# Patient Record
Sex: FEMALE | Race: WHITE | HISPANIC OR LATINO | Employment: UNEMPLOYED | URBAN - METROPOLITAN AREA
[De-identification: names, ages, dates, MRNs, and addresses within clinical notes are randomized per-mention and may not be internally consistent; named-entity substitution may affect disease eponyms.]

---

## 2017-04-28 ENCOUNTER — HOSPITAL ENCOUNTER (EMERGENCY)
Facility: HOSPITAL | Age: 32
Discharge: HOME | End: 2017-04-28
Attending: EMERGENCY MEDICINE

## 2017-04-28 VITALS
SYSTOLIC BLOOD PRESSURE: 105 MMHG | DIASTOLIC BLOOD PRESSURE: 74 MMHG | RESPIRATION RATE: 18 BRPM | OXYGEN SATURATION: 99 % | TEMPERATURE: 98 F | HEART RATE: 74 BPM

## 2017-04-28 DIAGNOSIS — M79.639 FOREARM PAIN: ICD-10-CM
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
## 2017-04-28 DIAGNOSIS — S50.12XA CONTUSION OF LEFT FOREARM, INITIAL ENCOUNTER: ICD-10-CM

## 2017-04-28 DIAGNOSIS — S16.1XXA NECK STRAIN, INITIAL ENCOUNTER: ICD-10-CM

## 2017-04-28 PROCEDURE — 99284 EMERGENCY DEPT VISIT MOD MDM: CPT

## 2017-04-28 PROCEDURE — 25000003 PHARM REV CODE 250: Performed by: EMERGENCY MEDICINE

## 2017-04-28 RX ORDER — HYDROCODONE BITARTRATE AND ACETAMINOPHEN 10; 325 MG/1; MG/1
1 TABLET ORAL EVERY 4 HOURS PRN
Qty: 18 TABLET | Refills: 0 | Status: SHIPPED | OUTPATIENT
Start: 2017-04-28

## 2017-04-28 RX ORDER — HYDROCODONE BITARTRATE AND ACETAMINOPHEN 10; 325 MG/1; MG/1
1 TABLET ORAL
Status: COMPLETED | OUTPATIENT
Start: 2017-04-28 | End: 2017-04-28

## 2017-04-28 RX ORDER — CYCLOBENZAPRINE HCL 10 MG
10 TABLET ORAL 3 TIMES DAILY PRN
Qty: 15 TABLET | Refills: 0 | Status: SHIPPED | OUTPATIENT
Start: 2017-04-28 | End: 2017-05-03

## 2017-04-28 RX ADMIN — HYDROCODONE BITARTRATE AND ACETAMINOPHEN 1 TABLET: 10; 325 TABLET ORAL at 07:04
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
## 2017-04-29 NOTE — DISCHARGE INSTRUCTIONS
Esguince O Distención Del Roxy [Neck Sprain/Strain]  Valorie fuerza repentina que provoque valorie torcedura o flexión del roxy (francisco lanie sucede, por ejemplo, en un accidente de automóvil) puede estirar o desgarrar los músculos (esguince [strain]) y los ligamentos (distención [sprain]) y ocasionar dolor en el roxy. En ocasiones, el dolor en el roxy (neck pain) ocurre después de un movimiento simple augustin extraño. Cualquiera sea el mavis, suele teodoro espasmos musculares (muscle spasm), lo cual aumenta el dolor.    A menos que usted haya tenido valorie lesión física por impacto (por ejemplo, valorie caída o un accidente de automóvil), no suelen pedirse radiografías (X-rays) para la evaluación inicial del dolor de roxy. Si el dolor persiste y no responde al tratamiento médico, es posible que le soliciten radiografías (X-rays) y otras pruebas más adelante.  Cuidados En [place]:  1) Es posible que sienta más dolor (soreness) y que se presenten otros espasmos (spasm) en las próximas 24 horas. Reduzca fitzpatrick nivel de actividad hasta que los síntomas comiencen a aliviarse.  2) Al acostarse, emplee valorie almohada cómoda para apoyar la julio cesar y mantener la columna en valorie posición neutra. La julio cesar no debería quedar inclinada hacia adelante ni hacia atrás.  3) Use compresas de hielo (hielo en valorie bolsa plástica, envuelta en valorie toalla) para aliviar el dolor mayra. Aplíquela giovanna 20 minutos cada 2 ó 4 horas en los primeros dos días. Luego, comience a aplicar calor local (valorie ducha caliente, un baño caliente o valorie almohadilla térmica) y masajes para reducir los espasmos musculares (muscle spasm). Algunos pacientes sienten que les resulta mejor alternar los tratamientos con calor y con frío. También puede utilizar sólo celi de estos métodos. Elija el que le resulte mejor y le proporcione mayor alivio.  4) Puede usar acetaminofén (acetaminophen) (Tylenol) o ibuprofeno (ibuprofen) (Motrin o Advil) para controlar el dolor, a menos que le  hayan recetado otro medicamento. [ NOTA : Si tiene valorie enfermedad hepática o renal crónica (chronic liver or kidney disease), o ha tenido alguna vez valorie úlcera estomacal (stomach ulcer) o sangrado gastrointestinal (GI bleeding), consulte con fitzpatrick médico antes de mehreen estos medicamentos.]  Programe valorie VISITA DE CONTROL con fitzpatrick médico o aramis centro si los síntomas no empiezan a mejorar después de valorie semana. Quizás necesite obtener fisioterapia (physical therapy).  [NOTA: Si le lugo hecho radiografías (X-rays) o valorie tomografía computarizada (CT scan), éstas serán evaluadas por un especialista. Le informaremos de los nuevos hallazgos que puedan afectar la atención médica que necesita.]  Busque Prontamente Atención Médica  si algo de lo siguiente ocurre:  -- El dolor empeora o se esparce a los brazos.  -- Siente debilidad o entumecimiento en celi o ambos brazos.  Date Last Reviewed: 11/19/2015  © 2870-7247 [employer]. [address] [place] [id number]. Todos los derechos reservados. Esta información no pretende sustituir la atención médica profesional. Sólo fitzpatrick médico puede diagnosticar y tratar un problema de cirilo.          R.I.C.E.  R.I.C.E. es el acrónimo de descansar, ponerse hielo, hacer presión y tener levantada la caryn lesionada, son medidas que le ayudan a tener menos dolor e hinchazón después de valorie distensión, esguince, magulladura o golpe sheng. Si se ha lesionado, siga estas sugerencias para mehreen medidas lo antes posible.  [place]  El dolor es la forma que tiene el cuerpo de decir que el área lesionada debe descansar. Ya sea que se haya golpeado el codo, la mano, el pie o la rodilla, limite el uso de la parte lesionada para evitar un mayor daño y ayudarse a sanar.  Hielo  Colocarse hielo inmediatamente después de valorie lesión ayuda a evitar la hinchazón y a calmar el dolor. No ponga el hielo directamente sobre la piel.  · Envuelva valorie bolsa de hielo en valorie phong delgada y colóquela sobre  el área lesionada.  · Póngase el hielo giovanna 10 minutos cada 3 horas, augustin no más de 20 minutos cada vez.  Compresión  Hacer presión (compresión) sobre la lesión ayuda a evitar la hinchazón y sirve de soporte.  · Vende firmemente el área lesionada con valorie venda elástica. Si siente cosquilleo en la mano o el pie, cambian de color o los siente fríos cuando los toca, puede ser que el vendaje esté muy ajustado. Lalit un nuevo vendaje más flojo.   · Si el vendaje se afloja demasiado, vuelva a vendarse.  · No use valorie venda elástica giovanna toda la noche.  Elevación  La elevación es más eficaz cuando se mantiene la lesión elevada más alto que el nivel del corazón. Mantener valorie lesión elevada ayuda a reducir la hinchazón, el dolor y la sensación palpitante.  Llame a fitzpatrick proveedor de atención médica si nota cualquiera de estos síntomas:  · Los dedos de la mano o del pie están adormecidos, lugo cambiado de color o están fríos cuando los toca.  · La piel está brillante o estirada.  · Empeora el dolor, la hinchazón o el moretón, y no mejoran cuando la lesión se levanta.       Date Last Reviewed: 9/3/2015  © 4445-1652 [employer]. [address] [place] [address], [place] [address]. Todos los derechos reservados. Esta información no pretende sustituir la atención médica profesional. Sólo fitzpatrick médico puede diagnosticar y tratar un problema de cirilo.
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
## 2019-07-17 ENCOUNTER — HOSPITAL ENCOUNTER (EMERGENCY)
Facility: HOSPITAL | Age: 34
Discharge: HOME | End: 2019-07-17
Attending: EMERGENCY MEDICINE
Payer: COMMERCIAL

## 2019-07-17 VITALS
WEIGHT: 150 LBS | OXYGEN SATURATION: 100 % | TEMPERATURE: 98 F | DIASTOLIC BLOOD PRESSURE: 73 MMHG | SYSTOLIC BLOOD PRESSURE: 108 MMHG | HEART RATE: 58 BPM | RESPIRATION RATE: 16 BRPM

## 2019-07-17 DIAGNOSIS — M54.31 SCIATICA OF RIGHT SIDE: Primary | ICD-10-CM

## 2019-07-17 LAB
ALBUMIN SERPL BCP-MCNC: 3.7 G/DL (ref 3.5–5.2)
ALP SERPL-CCNC: 60 U/L (ref 55–135)
ALT SERPL W/O P-5'-P-CCNC: 15 U/L (ref 10–44)
ANION GAP SERPL CALC-SCNC: 11 MMOL/L (ref 8–16)
AST SERPL-CCNC: 19 U/L (ref 10–40)
B-HCG UR QL: NEGATIVE
BASOPHILS # BLD AUTO: 0.02 K/UL (ref 0–0.2)
BASOPHILS NFR BLD: 0.4 % (ref 0–1.9)
BILIRUB SERPL-MCNC: 0.3 MG/DL (ref 0.1–1)
BILIRUB UR QL STRIP: NEGATIVE
BUN SERPL-MCNC: 14 MG/DL (ref 6–20)
CALCIUM SERPL-MCNC: 9.2 MG/DL (ref 8.7–10.5)
CHLORIDE SERPL-SCNC: 106 MMOL/L (ref 95–110)
CLARITY UR: CLEAR
CO2 SERPL-SCNC: 22 MMOL/L (ref 23–29)
COLOR UR: YELLOW
CREAT SERPL-MCNC: 0.9 MG/DL (ref 0.5–1.4)
DIFFERENTIAL METHOD: NORMAL
EOSINOPHIL # BLD AUTO: 0.2 K/UL (ref 0–0.5)
EOSINOPHIL NFR BLD: 3.9 % (ref 0–8)
ERYTHROCYTE [DISTWIDTH] IN BLOOD BY AUTOMATED COUNT: 13.4 % (ref 11.5–14.5)
EST. GFR  (AFRICAN AMERICAN): >60 ML/MIN/1.73 M^2
EST. GFR  (NON AFRICAN AMERICAN): >60 ML/MIN/1.73 M^2
GLUCOSE SERPL-MCNC: 84 MG/DL (ref 70–110)
GLUCOSE UR QL STRIP: NEGATIVE
HCT VFR BLD AUTO: 39.2 % (ref 37–48.5)
HGB BLD-MCNC: 13.1 G/DL (ref 12–16)
HGB UR QL STRIP: NEGATIVE
KETONES UR QL STRIP: NEGATIVE
LEUKOCYTE ESTERASE UR QL STRIP: NEGATIVE
LYMPHOCYTES # BLD AUTO: 1.9 K/UL (ref 1–4.8)
LYMPHOCYTES NFR BLD: 36.3 % (ref 18–48)
MCH RBC QN AUTO: 30.1 PG (ref 27–31)
MCHC RBC AUTO-ENTMCNC: 33.4 G/DL (ref 32–36)
MCV RBC AUTO: 90 FL (ref 82–98)
MONOCYTES # BLD AUTO: 0.6 K/UL (ref 0.3–1)
MONOCYTES NFR BLD: 10.9 % (ref 4–15)
NEUTROPHILS # BLD AUTO: 2.5 K/UL (ref 1.8–7.7)
NEUTROPHILS NFR BLD: 48.9 % (ref 38–73)
NITRITE UR QL STRIP: NEGATIVE
PH UR STRIP: 6 [PH] (ref 5–8)
PLATELET # BLD AUTO: 232 K/UL (ref 150–350)
PMV BLD AUTO: 11.7 FL (ref 9.2–12.9)
POTASSIUM SERPL-SCNC: 3.8 MMOL/L (ref 3.5–5.1)
PROT SERPL-MCNC: 7.1 G/DL (ref 6–8.4)
PROT UR QL STRIP: NEGATIVE
RBC # BLD AUTO: 4.35 M/UL (ref 4–5.4)
SODIUM SERPL-SCNC: 139 MMOL/L (ref 136–145)
SP GR UR STRIP: 1.01 (ref 1–1.03)
URN SPEC COLLECT METH UR: NORMAL
UROBILINOGEN UR STRIP-ACNC: NEGATIVE EU/DL
WBC # BLD AUTO: 5.13 K/UL (ref 3.9–12.7)

## 2019-07-17 PROCEDURE — 51798 US URINE CAPACITY MEASURE: CPT

## 2019-07-17 PROCEDURE — 96374 THER/PROPH/DIAG INJ IV PUSH: CPT

## 2019-07-17 PROCEDURE — 80053 COMPREHEN METABOLIC PANEL: CPT

## 2019-07-17 PROCEDURE — 25000003 PHARM REV CODE 250: Performed by: EMERGENCY MEDICINE

## 2019-07-17 PROCEDURE — 81003 URINALYSIS AUTO W/O SCOPE: CPT

## 2019-07-17 PROCEDURE — 96375 TX/PRO/DX INJ NEW DRUG ADDON: CPT

## 2019-07-17 PROCEDURE — 99284 EMERGENCY DEPT VISIT MOD MDM: CPT | Mod: 25

## 2019-07-17 PROCEDURE — 63600175 PHARM REV CODE 636 W HCPCS: Performed by: EMERGENCY MEDICINE

## 2019-07-17 PROCEDURE — 96361 HYDRATE IV INFUSION ADD-ON: CPT

## 2019-07-17 PROCEDURE — 85025 COMPLETE CBC W/AUTO DIFF WBC: CPT

## 2019-07-17 PROCEDURE — 81025 URINE PREGNANCY TEST: CPT

## 2019-07-17 RX ORDER — ONDANSETRON 2 MG/ML
4 INJECTION INTRAMUSCULAR; INTRAVENOUS
Status: COMPLETED | OUTPATIENT
Start: 2019-07-17 | End: 2019-07-17

## 2019-07-17 RX ORDER — KETOROLAC TROMETHAMINE 30 MG/ML
30 INJECTION, SOLUTION INTRAMUSCULAR; INTRAVENOUS
Status: COMPLETED | OUTPATIENT
Start: 2019-07-17 | End: 2019-07-17

## 2019-07-17 RX ORDER — NAPROXEN 500 MG/1
500 TABLET ORAL 2 TIMES DAILY WITH MEALS
Qty: 30 TABLET | Refills: 0 | Status: SHIPPED | OUTPATIENT
Start: 2019-07-17

## 2019-07-17 RX ORDER — CYCLOBENZAPRINE HCL 10 MG
10 TABLET ORAL 3 TIMES DAILY PRN
Qty: 15 TABLET | Refills: 0 | Status: SHIPPED | OUTPATIENT
Start: 2019-07-17 | End: 2019-07-22

## 2019-07-17 RX ADMIN — SODIUM CHLORIDE 1000 ML: 0.9 INJECTION, SOLUTION INTRAVENOUS at 12:07

## 2019-07-17 RX ADMIN — ONDANSETRON 4 MG: 2 INJECTION INTRAMUSCULAR; INTRAVENOUS at 09:07

## 2019-07-17 RX ADMIN — SODIUM CHLORIDE 1000 ML: 0.9 INJECTION, SOLUTION INTRAVENOUS at 09:07

## 2019-07-17 RX ADMIN — KETOROLAC TROMETHAMINE 30 MG: 30 INJECTION, SOLUTION INTRAMUSCULAR at 11:07

## 2019-07-17 NOTE — ED NOTES
Pt presents to ED today with c/o right sided flank pain. Associated symptoms include 1 episode of emesis & decreased urine output. Pt denies chest pain, SOB, fever, chills.    Patient identifiers verified and correct for Robert Harper.    Level of Consciousness: The patient is awake, alert and aware of environment with an appropriate affect, the patient is oriented x 3 and speaking appropriately.  Appearance: Patient resting comfortably and in no acute distress, patient is clean and well groomed, patient's clothing is properly fastened.  Skin: The skin is warm and dry, color consistent with ethnicity, patient has normal skin turgor and moist mucus membranes, skin intact, no breakdown or bruising noted.  Musculoskeletal: Moves all extremities well in full range of motion. No obvious deformities or swelling noted.  Respiratory: Airway open and patent, respirations spontaneous, even and unlabored. No accessory muscles in use. Breath sounds clear & equal  Cardiac: Patient has a normal rate, no periphreal edema noted, capillary refill < 3 seconds. good pulses palpated peripherally.  Abdomen: Soft, non-tender to palpation. No distention noted. +right flank pain, pt reports last urinated yesterday (bladder scan performed- see charting)  Neurologic: PERRLA, face exhibits symmetrical expression, hand grasps equal and even bilaterally, reports normal sensation to all extremities and face.    Patient verbalized understanding of status and plan of care. Patient changed into hospital gown with assistance. Side rails up x 2, call light in reach, bed low and locked. [facility].

## 2019-07-17 NOTE — ED PROVIDER NOTES
SCRIBE #1 NOTE: I, Mary Ellen Garcias, am scribing for, and in the presence of, Azul Jacinto MD. I have scribed the entire note.       History     Chief Complaint   Patient presents with    Flank Pain     right flank pain with vomiting     Review of patient's allergies indicates:  No Known Allergies      History of Present Illness     HPI    7/17/2019, 10:15 AM  History obtained from the patient      History of Present Illness: Robert Harper is a 33 y.o. female patient with a PMHx of HIV and kidney stone (3 years ago) who presents to the Emergency Department for evaluation of R flank pain which onset gradually 3 days ago. Pain radiates down to lower abdomen and R leg. Symptoms are constant and moderate in severity. No mitigating or exacerbating factors reported. Associated sxs include vomiting (1 episode). Last urinated yesterday. Patient denies any fever, chills, dysuria, hematuria, urinary frequency/urgency, N/V, and all other sxs at this time. No prior Tx. No further complaints or concerns at this time.         Arrival mode: Personal vehicle    PCP: Primary Doctor No        Past Medical History:  Past Medical History:   Diagnosis Date    HIV (human immunodeficiency virus infection)        Past Surgical History:  History reviewed. No pertinent surgical history.    Family History:  History reviewed. No pertinent family history.    Social History     Socioeconomic History    Marital status:      Spouse name: Not on file    Number of children: Not on file    Years of education: Not on file    Highest education level: Not on file   Occupational History    Not on file   Social Needs    Financial resource strain: Not on file    Food insecurity:     Worry: Not on file     Inability: Not on file    Transportation needs:     Medical: Not on file     Non-medical: Not on file   Tobacco Use    Smoking status: Never Smoker   Substance and Sexual Activity    Alcohol use: Not Currently     Drug use: Not Currently    Sexual activity: Not on file   Lifestyle    Physical activity:     Days per week: Not on file     Minutes per session: Not on file    Stress: Not on file   Relationships    Social connections:     Talks on phone: Not on file     Gets together: Not on file     Attends Oriental orthodox service: Not on file     Active member of club or organization: Not on file     Attends meetings of clubs or organizations: Not on file     Relationship status: Not on file   Other Topics Concern    Not on file   Social History Narrative    Not on file              Review of Systems     Review of Systems   Constitutional: Negative for activity change, appetite change, chills, diaphoresis, fatigue and fever.   HENT: Negative for congestion, ear pain, nosebleeds, rhinorrhea, sinus pain, sore throat and trouble swallowing.    Eyes: Negative for pain and discharge.   Respiratory: Negative for cough, chest tightness, shortness of breath, wheezing and stridor.    Cardiovascular: Negative for chest pain, palpitations and leg swelling.   Gastrointestinal: Positive for abdominal pain (lower) and vomiting. Negative for abdominal distention, blood in stool, constipation, diarrhea and nausea.   Genitourinary: Positive for decreased urine volume and flank pain (R). Negative for difficulty urinating, dysuria, frequency, hematuria and urgency.   Musculoskeletal: Negative for arthralgias, back pain, myalgias and neck pain.   Skin: Negative for pallor, rash and wound.   Neurological: Negative for dizziness, syncope, weakness, light-headedness, numbness and headaches.   Hematological: Does not bruise/bleed easily.   Psychiatric/Behavioral: Negative for confusion and self-injury.   All other systems reviewed and are negative.     Physical Exam     Initial Vitals [07/17/19 0925]   BP Pulse Resp Temp SpO2   (!) 99/58 68 (!) 26 97.5 °F (36.4 °C) 100 %      MAP       --          Physical Exam  Nursing Notes and Vital Signs  Reviewed.  Constitutional: Patient is in mild distress. Well-developed and well-nourished.  Head: Atraumatic. Normocephalic.  Eyes: PERRL. EOM intact. Conjunctivae are not pale. No scleral icterus.  ENT: Mucous membranes are moist. Oropharynx is clear and symmetric.    Neck: Supple. Full ROM. No lymphadenopathy.  Cardiovascular: Regular rate. Regular rhythm. No murmurs, rubs, or gallops. Distal pulses are 2+ and symmetric.  Pulmonary/Chest: No respiratory distress. Clear to auscultation bilaterally. No wheezing or rales.  Abdominal: Soft and non-distended.  There is no tenderness.  No rebound, guarding, or rigidity. Good bowel sounds.  Genitourinary: R CVA tenderness  Musculoskeletal: Moves all extremities. No obvious deformities. No edema. No calf tenderness. Negative straight leg test. Normal gait and motor observed.   Skin: Warm and dry.  Neurological:  Alert, awake, and appropriate.  Normal speech.  No acute focal neurological deficits are appreciated.  Psychiatric: Normal affect. Good eye contact. Appropriate in content.     ED Course   Procedures  ED Vital Signs:  Vitals:    07/17/19 0925 07/17/19 1206 07/17/19 1235 07/17/19 1246   BP: (!) 99/58 (!) 100/59 (!) 93/50 (!) 102/55   Pulse: 68 (!) 49 (!) 55 (!) 54   Resp: (!) 26  16    Temp: 97.5 °F (36.4 °C)  97.9 °F (36.6 °C)    TempSrc: Oral  Oral    SpO2: 100% 99% 100%    Weight: 68 kg (150 lb)       07/17/19 1248 07/17/19 1250 07/17/19 1349   BP: 104/60 108/62 108/73   Pulse: (!) 56 (!) 58 (!) 58   Resp:   16   Temp:   98 °F (36.7 °C)   TempSrc:   Oral   SpO2:   100%   Weight:          Abnormal Lab Results:  Labs Reviewed   COMPREHENSIVE METABOLIC PANEL - Abnormal; Notable for the following components:       Result Value    CO2 22 (*)     All other components within normal limits   CBC W/ AUTO DIFFERENTIAL   URINALYSIS, REFLEX TO URINE CULTURE    Narrative:     Preferred Collection Type->Urine, Clean Catch   PREGNANCY TEST, URINE RAPID        All Lab  Results:  Results for orders placed or performed during the hospital encounter of 07/17/19   CBC auto differential   Result Value Ref Range    WBC 5.13 3.90 - 12.70 K/uL    RBC 4.35 4.00 - 5.40 M/uL    Hemoglobin 13.1 12.0 - 16.0 g/dL    Hematocrit 39.2 37.0 - 48.5 %    Mean Corpuscular Volume 90 82 - 98 fL    Mean Corpuscular Hemoglobin 30.1 27.0 - 31.0 pg    Mean Corpuscular Hemoglobin Conc 33.4 32.0 - 36.0 g/dL    RDW 13.4 11.5 - 14.5 %    Platelets 232 150 - 350 K/uL    MPV 11.7 9.2 - 12.9 fL    Gran # (ANC) 2.5 1.8 - 7.7 K/uL    Lymph # 1.9 1.0 - 4.8 K/uL    Mono # 0.6 0.3 - 1.0 K/uL    Eos # 0.2 0.0 - 0.5 K/uL    Baso # 0.02 0.00 - 0.20 K/uL    Gran% 48.9 38.0 - 73.0 %    Lymph% 36.3 18.0 - 48.0 %    Mono% 10.9 4.0 - 15.0 %    Eosinophil% 3.9 0.0 - 8.0 %    Basophil% 0.4 0.0 - 1.9 %    Differential Method Automated    Comprehensive metabolic panel   Result Value Ref Range    Sodium 139 136 - 145 mmol/L    Potassium 3.8 3.5 - 5.1 mmol/L    Chloride 106 95 - 110 mmol/L    CO2 22 (L) 23 - 29 mmol/L    Glucose 84 70 - 110 mg/dL    BUN, Bld 14 6 - 20 mg/dL    Creatinine 0.9 0.5 - 1.4 mg/dL    Calcium 9.2 8.7 - 10.5 mg/dL    Total Protein 7.1 6.0 - 8.4 g/dL    Albumin 3.7 3.5 - 5.2 g/dL    Total Bilirubin 0.3 0.1 - 1.0 mg/dL    Alkaline Phosphatase 60 55 - 135 U/L    AST 19 10 - 40 U/L    ALT 15 10 - 44 U/L    Anion Gap 11 8 - 16 mmol/L    eGFR if African American >60 >60 mL/min/1.73 m^2    eGFR if non African American >60 >60 mL/min/1.73 m^2   Urinalysis, Reflex to Urine Culture Urine, Clean Catch   Result Value Ref Range    Specimen UA Urine, Clean Catch     Color, UA Yellow Yellow, Straw, Eliz    Appearance, UA Clear Clear    pH, UA 6.0 5.0 - 8.0    Specific Gravity, UA 1.015 1.005 - 1.030    Protein, UA Negative Negative    Glucose, UA Negative Negative    Ketones, UA Negative Negative    Bilirubin (UA) Negative Negative    Occult Blood UA Negative Negative    Nitrite, UA Negative Negative    Urobilinogen, UA  Negative <2.0 EU/dL    Leukocytes, UA Negative Negative   Pregnancy, urine rapid (UPT)   Result Value Ref Range    Preg Test, Ur Negative          Imaging Results:  Imaging Results          CT Renal Stone Study ABD Pelvis WO (Final result)  Result time 07/17/19 12:13:51    Final result by Nolberto Drummond III, MD (07/17/19 12:13:51)                 Impression:      L5 spondylolysis with minimal grade 1 spondylolisthesis.  No acute abnormality identified in the abdomen or pelvis.  Negative for urolithiasis or hydronephrosis.  Chronic right renal atrophy.    All CT scans at this facility are performed  using dose modulation techniques as appropriate to performed exam including the following:  automated exposure control; adjustment of mA and/or kV according to the patients size (this includes techniques or standardized protocols for targeted exams where dose is matched to indication/reason for exam: i.e. extremities or head);  iterative reconstruction technique.      Electronically signed by: Nolberto Drummond MD  Date:    07/17/2019  Time:    12:13             Narrative:    EXAMINATION:  CT RENAL STONE STUDY ABD PELVIS WO    CLINICAL HISTORY:  Flank pain, stone disease suspected;    TECHNIQUE:  Routine CT abdomen and pelvis performed without IV contrast.  Coronal and sagittal reformatted images obtained.    COMPARISON:  No prior abdominal CT available    FINDINGS:  The lung bases are clear.  There are chronic appearing bilateral L5 pars defects with minimal grade 1 spondylolisthesis at L5-S1. No acute osseous abnormality or suspicious bone lesion identified.    There is asymmetric chronic atrophy and parenchymal scarring of the right kidney.  The kidneys and ureters are otherwise unremarkable without evidence of urolithiasis or hydronephrosis.  The bladder is unremarkable.    The unenhanced liver, pancreas, gallbladder, bile ducts, spleen and adrenals are unremarkable.    There is no evidence of bowel obstruction, active  bowel inflammation or other acute bowel pathology.  There is no evidence of appendicitis.  No free intraperitoneal air, free fluid or abscess identified.  The aorta is normal in caliber.  No pathologically enlarged lymph nodes identified.  No obvious noncontrast CT evidence of acute uterine or adnexal pathology.                                       The Emergency Provider reviewed the vital signs and test results, which are outlined above.     ED Discussion     12:39 PM: Discussed with pt all pertinent ED information and results. Discussed pt dx and plan of tx. Gave pt all f/u and return to the ED instructions. All questions and concerns were addressed at this time. Pt expresses understanding of information and instructions, and is comfortable with plan to discharge. Pt is stable for discharge.    I discussed with patient and/or family/caretaker that evaluation in the ED does not suggest any emergent or life threatening medical conditions requiring immediate intervention beyond what was provided in the ED, and I believe patient is safe for discharge.  Regardless, an unremarkable evaluation in the ED does not preclude the development or presence of a serious of life threatening condition. As such, patient was instructed to return immediately for any worsening or change in current symptoms.      ED Medication(s):  Medications   sodium chloride 0.9% bolus 1,000 mL (0 mLs Intravenous Stopped 7/17/19 1100)   ondansetron injection 4 mg (4 mg Intravenous Given 7/17/19 0944)   ketorolac injection 30 mg (30 mg Intravenous Given 7/17/19 1153)   sodium chloride 0.9% bolus 1,000 mL (0 mLs Intravenous Stopped 7/17/19 1357)       Discharge Medication List as of 7/17/2019 12:26 PM      START taking these medications    Details   cyclobenzaprine (FLEXERIL) 10 MG tablet Take 1 tablet (10 mg total) by mouth 3 (three) times daily as needed., Starting Wed 7/17/2019, Until Mon 7/22/2019, Print      naproxen (NAPROSYN) 500 MG tablet Take  1 tablet (500 mg total) by mouth 2 (two) times daily with meals., Starting Wed 7/17/2019, Print             Follow-up Information     [facility]. Schedule an appointment as soon as possible for a visit in 2 days.    Why:  Return to the Emergency Room, If symptoms worsen  Contact information:  [address] [facility] [address]  [phone]                         Medical Decision Making:   Clinical Tests:   Lab Tests: Ordered and Reviewed  Radiological Study: Ordered and Reviewed             Scribe Attestation:   Scribe #1: I performed the above scribed service and the documentation accurately describes the services I performed. I attest to the accuracy of the note.     Attending:   Physician Attestation Statement for Scribe #1: I, Azul Jacinto MD, personally performed the services described in this documentation, as scribed by Mary Ellen Garcias, in my presence, and it is both accurate and complete.           Clinical Impression       ICD-10-CM ICD-9-CM   1. Sciatica of right side M54.31 724.3       Disposition:   Disposition: Discharged  Condition: Stable         Azul Jacinto MD  07/17/19 1524